# Patient Record
Sex: FEMALE | Race: WHITE | Employment: FULL TIME | URBAN - METROPOLITAN AREA
[De-identification: names, ages, dates, MRNs, and addresses within clinical notes are randomized per-mention and may not be internally consistent; named-entity substitution may affect disease eponyms.]

---

## 2018-02-14 ENCOUNTER — HOSPITAL ENCOUNTER (INPATIENT)
Facility: HOSPITAL | Age: 48
LOS: 4 days | Discharge: HOME | DRG: 189 | End: 2018-02-18
Attending: EMERGENCY MEDICINE | Admitting: HOSPITALIST
Payer: COMMERCIAL

## 2018-02-14 DIAGNOSIS — J20.5 RSV BRONCHITIS: Primary | ICD-10-CM

## 2018-02-14 DIAGNOSIS — R09.02 HYPOXIA: ICD-10-CM

## 2018-02-14 DIAGNOSIS — J98.01 ACUTE BRONCHOSPASM: ICD-10-CM

## 2018-02-14 LAB
ANION GAP SERPL CALC-SCNC: 10 MMOL/L (ref 0–18)
BASOPHILS # BLD: 0.1 K/UL (ref 0–0.2)
BASOPHILS NFR BLD: 1 %
BUN SERPL-MCNC: 11 MG/DL (ref 8–20)
BUN/CREAT SERPL: 10.3 (ref 10–20)
CALCIUM SERPL-MCNC: 8.7 MG/DL (ref 8.5–10.5)
CHLORIDE SERPL-SCNC: 98 MMOL/L (ref 95–110)
CO2 SERPL-SCNC: 27 MMOL/L (ref 22–32)
CREAT SERPL-MCNC: 1.07 MG/DL (ref 0.5–1.5)
EOSINOPHIL # BLD: 0.1 K/UL (ref 0–0.7)
EOSINOPHIL NFR BLD: 1 %
ERYTHROCYTE [DISTWIDTH] IN BLOOD BY AUTOMATED COUNT: 14 % (ref 11–15)
FLUAV + FLUBV RNA SPEC NAA+PROBE: NEGATIVE
FLUAV + FLUBV RNA SPEC NAA+PROBE: NEGATIVE
FLUAV + FLUBV RNA SPEC NAA+PROBE: POSITIVE
GLUCOSE SERPL-MCNC: 117 MG/DL (ref 70–99)
HCT VFR BLD AUTO: 43.9 % (ref 35–48)
HGB BLD-MCNC: 15.1 G/DL (ref 12–16)
LYMPHOCYTES # BLD: 2.3 K/UL (ref 1–4)
LYMPHOCYTES NFR BLD: 26 %
MCH RBC QN AUTO: 29.8 PG (ref 27–32)
MCHC RBC AUTO-ENTMCNC: 34.3 G/DL (ref 32–37)
MCV RBC AUTO: 86.9 FL (ref 80–100)
MONOCYTES # BLD: 1.1 K/UL (ref 0–1)
MONOCYTES NFR BLD: 12 %
NEUTROPHILS # BLD AUTO: 5.3 K/UL (ref 1.8–7.7)
NEUTROPHILS NFR BLD: 60 %
OSMOLALITY UR CALC.SUM OF ELEC: 280 MOSM/KG (ref 275–295)
PLATELET # BLD AUTO: 165 K/UL (ref 140–400)
PMV BLD AUTO: 8.8 FL (ref 7.4–10.3)
POTASSIUM SERPL-SCNC: 3.3 MMOL/L (ref 3.3–5.1)
RBC # BLD AUTO: 5.05 M/UL (ref 3.7–5.4)
SODIUM SERPL-SCNC: 135 MMOL/L (ref 136–144)
WBC # BLD AUTO: 8.8 K/UL (ref 4–11)

## 2018-02-14 PROCEDURE — 99253 IP/OBS CNSLTJ NEW/EST LOW 45: CPT | Performed by: INTERNAL MEDICINE

## 2018-02-14 PROCEDURE — 99223 1ST HOSP IP/OBS HIGH 75: CPT | Performed by: HOSPITALIST

## 2018-02-14 RX ORDER — IPRATROPIUM BROMIDE AND ALBUTEROL SULFATE 2.5; .5 MG/3ML; MG/3ML
3 SOLUTION RESPIRATORY (INHALATION) EVERY 4 HOURS PRN
Status: DISCONTINUED | OUTPATIENT
Start: 2018-02-14 | End: 2018-02-18

## 2018-02-14 RX ORDER — ALBUTEROL SULFATE 2.5 MG/3ML
2.5 SOLUTION RESPIRATORY (INHALATION) ONCE
Status: COMPLETED | OUTPATIENT
Start: 2018-02-14 | End: 2018-02-14

## 2018-02-14 RX ORDER — ONDANSETRON 2 MG/ML
4 INJECTION INTRAMUSCULAR; INTRAVENOUS EVERY 6 HOURS PRN
Status: DISCONTINUED | OUTPATIENT
Start: 2018-02-14 | End: 2018-02-18

## 2018-02-14 RX ORDER — SODIUM CHLORIDE 9 MG/ML
INJECTION, SOLUTION INTRAVENOUS CONTINUOUS
Status: DISCONTINUED | OUTPATIENT
Start: 2018-02-14 | End: 2018-02-15

## 2018-02-14 RX ORDER — METHYLPREDNISOLONE SODIUM SUCCINATE 40 MG/ML
40 INJECTION, POWDER, LYOPHILIZED, FOR SOLUTION INTRAMUSCULAR; INTRAVENOUS EVERY 6 HOURS
Status: DISCONTINUED | OUTPATIENT
Start: 2018-02-14 | End: 2018-02-15

## 2018-02-14 RX ORDER — IPRATROPIUM BROMIDE AND ALBUTEROL SULFATE 2.5; .5 MG/3ML; MG/3ML
3 SOLUTION RESPIRATORY (INHALATION) ONCE
Status: COMPLETED | OUTPATIENT
Start: 2018-02-14 | End: 2018-02-14

## 2018-02-14 RX ORDER — SODIUM CHLORIDE 0.9 % (FLUSH) 0.9 %
3 SYRINGE (ML) INJECTION AS NEEDED
Status: DISCONTINUED | OUTPATIENT
Start: 2018-02-14 | End: 2018-02-18

## 2018-02-14 RX ORDER — ASCORBIC ACID 500 MG
1000 TABLET ORAL DAILY
COMMUNITY

## 2018-02-14 RX ORDER — METHYLPREDNISOLONE SODIUM SUCCINATE 125 MG/2ML
125 INJECTION, POWDER, LYOPHILIZED, FOR SOLUTION INTRAMUSCULAR; INTRAVENOUS ONCE
Status: COMPLETED | OUTPATIENT
Start: 2018-02-14 | End: 2018-02-14

## 2018-02-14 RX ORDER — ALBUTEROL SULFATE 90 UG/1
2 AEROSOL, METERED RESPIRATORY (INHALATION) EVERY 4 HOURS PRN
Qty: 1 INHALER | Refills: 0 | Status: SHIPPED | OUTPATIENT
Start: 2018-02-14 | End: 2018-02-18

## 2018-02-14 RX ORDER — GUAIFENESIN 100 MG/5ML
200 SOLUTION ORAL EVERY 4 HOURS PRN
Status: DISCONTINUED | OUTPATIENT
Start: 2018-02-14 | End: 2018-02-15

## 2018-02-14 RX ORDER — PREDNISONE 20 MG/1
40 TABLET ORAL DAILY
Qty: 8 TABLET | Refills: 0 | Status: SHIPPED | OUTPATIENT
Start: 2018-02-14 | End: 2018-02-18

## 2018-02-14 RX ORDER — ENOXAPARIN SODIUM 100 MG/ML
40 INJECTION SUBCUTANEOUS EVERY 24 HOURS
Status: DISCONTINUED | OUTPATIENT
Start: 2018-02-14 | End: 2018-02-18

## 2018-02-14 RX ORDER — ALBUTEROL SULFATE 2.5 MG/3ML
2.5 SOLUTION RESPIRATORY (INHALATION) ONCE
Status: DISCONTINUED | OUTPATIENT
Start: 2018-02-14 | End: 2018-02-14

## 2018-02-14 RX ORDER — ACETAMINOPHEN 325 MG/1
650 TABLET ORAL EVERY 6 HOURS PRN
Status: DISCONTINUED | OUTPATIENT
Start: 2018-02-14 | End: 2018-02-18

## 2018-02-14 NOTE — ED NOTES
Care assumed from triage. Patient presents from [address] [place] [address] for shortness of breath and decrease O2 saturations. Patient reports flu-like symptoms since Friday including cough, fever, and body aches. Denies chest pain.  Patient reports increased shortness of

## 2018-02-14 NOTE — ED INITIAL ASSESSMENT (HPI)
Sent from immediate care today for low oxygen level and sob. Dx with flu like symptoms since Friday. C/o cough with small amt of mucous production, fever, and generalized body pain.   Had CXR done at [address] [place] [address] prior to arrival.

## 2018-02-14 NOTE — ED NOTES
Patient is resting comfortably. Reporting decrease in SOB. Unsuccessful room air trial, saturations drop to 88% on room air, placed back on nasal canula. O2 saturations continue at 89-92%. MD aware. Pt denies additional needs at this time.  Will continue to

## 2018-02-14 NOTE — CONSULTS
[facility]    Report of Consultation    Albertina Maria Tlisa Patient Status:  Emergency    3/9/1970 MRN [id number]   Location [address] Attending Cameron Mae MD   Hosp Day # 0 PCP Verner Stabler, MD     Date o rash, itching  Neurological: denies syncope, weakness, dizziness,   Psychiatric: denies depression, anxiety  Hematologic: denies bruising        Physical Exam:   Blood pressure 128/69, pulse 87, temperature 98.7 °F (37.1 °C), temperature source Oral, resp.

## 2018-02-14 NOTE — ED PROVIDER NOTES
Patient Seen in: [facility] AND [facility] Emergency Department    History   Patient presents with:  Dyspnea CHARISMA SOB (respiratory)    Stated Complaint: sob    HPI    Patient presents with onset of Friday with fevers cough congestion body aches and tiredness.   Sh (!) 85%   BMI 30.18 kg/m²         Physical Exam  Constitutional:  Alert, well nourished adult lying in bed in no distress. Vital signs noted. Eye:  No scleral icterus. Eyelids appear normal, no lesions.   HEENT: Oropharynx moist there is no erythema no e within normal limits   INFLUENZA A/B AND RSV PCR - Abnormal; Notable for the following:     RSV by PCR Positive (*)     All other components within normal limits   CBC W/ DIFFERENTIAL - Abnormal; Notable for the following:     Monocyte Absolute 1.1 (*)

## 2018-02-14 NOTE — ED NOTES
Patient accompanied by transport and family to inpatient unit. Patient agreeable with plan of care. Hemodynamically stable and interacting appropriately.

## 2018-02-14 NOTE — H&P
[facility]    PATIENT'S NAME: Serafin Rebolledo   ATTENDING PHYSICIAN: Zion Urena.  Lynn Alegria MD   PATIENT ACCOUNT#:   [id number]    LOCATION:  [place] [address]  MEDICAL RECORD #:   [id number]       YOB: 1970  ADMISSION DATE:       02/14/2018 palate. Eyes: Injected conjunctivae bilaterally. Pupils equal, round, reactive to light and accommodation. Extraocular muscle movements intact. NECK:  Supple. No lymphadenopathy. Trachea midline. Full range of motion.   LUNGS:  Clear to auscultatio

## 2018-02-15 ENCOUNTER — APPOINTMENT (OUTPATIENT)
Dept: GENERAL RADIOLOGY | Facility: HOSPITAL | Age: 48
DRG: 189 | End: 2018-02-15
Attending: HOSPITALIST
Payer: COMMERCIAL

## 2018-02-15 LAB
ANION GAP SERPL CALC-SCNC: 7 MMOL/L (ref 0–18)
BASOPHILS # BLD: 0 K/UL (ref 0–0.2)
BASOPHILS NFR BLD: 0 %
BUN SERPL-MCNC: 13 MG/DL (ref 8–20)
BUN/CREAT SERPL: 18.8 (ref 10–20)
CALCIUM SERPL-MCNC: 8.9 MG/DL (ref 8.5–10.5)
CHLORIDE SERPL-SCNC: 104 MMOL/L (ref 95–110)
CO2 SERPL-SCNC: 27 MMOL/L (ref 22–32)
CREAT SERPL-MCNC: 0.69 MG/DL (ref 0.5–1.5)
EOSINOPHIL # BLD: 0 K/UL (ref 0–0.7)
EOSINOPHIL NFR BLD: 0 %
ERYTHROCYTE [DISTWIDTH] IN BLOOD BY AUTOMATED COUNT: 13.8 % (ref 11–15)
GLUCOSE SERPL-MCNC: 157 MG/DL (ref 70–99)
HCT VFR BLD AUTO: 41.1 % (ref 35–48)
HGB BLD-MCNC: 14.1 G/DL (ref 12–16)
LYMPHOCYTES # BLD: 1.5 K/UL (ref 1–4)
LYMPHOCYTES NFR BLD: 19 %
MCH RBC QN AUTO: 29.9 PG (ref 27–32)
MCHC RBC AUTO-ENTMCNC: 34.2 G/DL (ref 32–37)
MCV RBC AUTO: 87.5 FL (ref 80–100)
MONOCYTES # BLD: 0.4 K/UL (ref 0–1)
MONOCYTES NFR BLD: 5 %
NEUTROPHILS # BLD AUTO: 6.2 K/UL (ref 1.8–7.7)
NEUTROPHILS NFR BLD: 76 %
OSMOLALITY UR CALC.SUM OF ELEC: 289 MOSM/KG (ref 275–295)
PLATELET # BLD AUTO: 191 K/UL (ref 140–400)
PMV BLD AUTO: 9.5 FL (ref 7.4–10.3)
POTASSIUM SERPL-SCNC: 3.9 MMOL/L (ref 3.3–5.1)
RBC # BLD AUTO: 4.7 M/UL (ref 3.7–5.4)
SODIUM SERPL-SCNC: 138 MMOL/L (ref 136–144)
WBC # BLD AUTO: 8.1 K/UL (ref 4–11)

## 2018-02-15 PROCEDURE — 99233 SBSQ HOSP IP/OBS HIGH 50: CPT | Performed by: INTERNAL MEDICINE

## 2018-02-15 PROCEDURE — 99233 SBSQ HOSP IP/OBS HIGH 50: CPT | Performed by: HOSPITALIST

## 2018-02-15 PROCEDURE — 71046 X-RAY EXAM CHEST 2 VIEWS: CPT | Performed by: HOSPITALIST

## 2018-02-15 RX ORDER — AZITHROMYCIN 250 MG/1
500 TABLET, FILM COATED ORAL
Status: DISCONTINUED | OUTPATIENT
Start: 2018-02-15 | End: 2018-02-18

## 2018-02-15 RX ORDER — GUAIFENESIN 600 MG
600 TABLET, EXTENDED RELEASE 12 HR ORAL 2 TIMES DAILY
Status: DISCONTINUED | OUTPATIENT
Start: 2018-02-15 | End: 2018-02-18

## 2018-02-15 RX ORDER — METHYLPREDNISOLONE SODIUM SUCCINATE 40 MG/ML
40 INJECTION, POWDER, LYOPHILIZED, FOR SOLUTION INTRAMUSCULAR; INTRAVENOUS EVERY 8 HOURS
Status: DISCONTINUED | OUTPATIENT
Start: 2018-02-15 | End: 2018-02-17

## 2018-02-15 RX ORDER — BENZONATATE 100 MG/1
100 CAPSULE ORAL 3 TIMES DAILY PRN
Status: DISCONTINUED | OUTPATIENT
Start: 2018-02-15 | End: 2018-02-18

## 2018-02-15 RX ORDER — ALBUTEROL SULFATE 2.5 MG/3ML
2.5 SOLUTION RESPIRATORY (INHALATION)
Status: DISCONTINUED | OUTPATIENT
Start: 2018-02-15 | End: 2018-02-16

## 2018-02-15 NOTE — PLAN OF CARE
Problem: Patient/Family Goals  Goal: Patient/Family Long Term Goal  Patient's Long Term Goal: discharge home    Interventions:  - Pt receiving IVF, IV solu medrol, nebs oxygen.    - See additional Care Plan goals for specific interventions    Outcome: Progr

## 2018-02-15 NOTE — PAYOR COMM NOTE
--------------  ADMISSION REVIEW     Payor: ALONA SALAS  Subscriber #:  [id number]  Authorization Number: [id number]    Admit date: 2/14/18  Admit time: [id number]       Patient Seen in: [facility] Emergency Department    History   Patient presents with: noted  Cardiovascular:  Normal S1 and S2, no murmur, regular, with good peripheral perfusion.   No extremity edema noted  Respiratory:    Diminished breath sounds at the bases no audible wheezes noted initially slightly increased respiratory rate  Abdomen: EXAMINATION    REASON FOR ADMISSION:  Acute respiratory failure, hypoxemia, RSV bronchitis.     HISTORY OF PRESENT ILLNESS:  Patient is a 44-year-old  female who started having generalized body aches, fevers, and progressive wheezing for the last 2 edema, clubbing, or cyanosis. NEUROLOGIC:  Motor and sensory intact. Cranial nerves II-XII are intact. ASSESSMENT:    1. Acute respiratory failure with hypoxemia. 2.   Acute bronchitis with positive respiratory syncytial virus infection.     REBECCA

## 2018-02-15 NOTE — PROGRESS NOTES
Pulmonary/Critical Care Follow Up Note    HPI:   Saad Landry is a 49year old female with Patient presents with:  Dyspnea CHARISMA SOB (respiratory)      PCP Tobin Liao MD  Admission Attending Jazmyn Becker MD    Hospital Day #1    No fever  Cough  ?  Whe CREATSERUM 0.69 02/15/2018   BUN 13 02/15/2018    02/15/2018   K 3.9 02/15/2018    02/15/2018   CO2 27 02/15/2018    02/15/2018   CA 8.9 02/15/2018           ASSESSMENT/PLAN:     1.   Acute hypoxemia  2/2 RSV  H/o smoking  Still in high

## 2018-02-15 NOTE — CM/SW NOTE
SW received [facility]. SW met w/ pt to discuss eventual discharge needs. Pt's  was also present. Pt lives at home w/ her supportive  and 2 children. Pt lives in a 2 level house w/ [address] stars.  Pt has a mother in law, aunt & un

## 2018-02-15 NOTE — PROGRESS NOTES
[facility]  Progress Note     Lien Guillen  : 3/9/1970    Status: Inpatient  Day #: 1    Attending: Ardell Olszewski, MD  PCP: Trice Vaughan MD      Assessment and Plan     Acute hypoxemic respiratory failure  - secondary to RSV infection 02/14/18 1743 216 lb (98 kg)         Body mass index is 34.86 kg/m².     Recent Labs   Lab  02/14/18   1208  02/15/18   0617   WBC  8.8  8.1   HGB  15.1  14.1   HCT  43.9  41.1   PLT  165  191   RBC  5.05  4.70   MCV  86.9  87.5   MCH  29.8  29.9   MCHC

## 2018-02-16 ENCOUNTER — APPOINTMENT (OUTPATIENT)
Dept: CT IMAGING | Facility: HOSPITAL | Age: 48
DRG: 189 | End: 2018-02-16
Attending: HOSPITALIST
Payer: COMMERCIAL

## 2018-02-16 ENCOUNTER — APPOINTMENT (OUTPATIENT)
Dept: GENERAL RADIOLOGY | Facility: HOSPITAL | Age: 48
DRG: 189 | End: 2018-02-16
Attending: INTERNAL MEDICINE
Payer: COMMERCIAL

## 2018-02-16 LAB
ANION GAP SERPL CALC-SCNC: 11 MMOL/L (ref 0–18)
BASE EXCESS BLD CALC-SCNC: 1.7 MMOL/L (ref ?–2)
BNP SERPL-MCNC: 115 PG/ML (ref 0–100)
BUN SERPL-MCNC: 16 MG/DL (ref 8–20)
BUN/CREAT SERPL: 17.4 (ref 10–20)
CALCIUM SERPL-MCNC: 9.1 MG/DL (ref 8.5–10.5)
CHLORIDE SERPL-SCNC: 106 MMOL/L (ref 95–110)
CO2 SERPL-SCNC: 25 MMOL/L (ref 22–32)
CREAT SERPL-MCNC: 0.92 MG/DL (ref 0.5–1.5)
GLUCOSE SERPL-MCNC: 144 MG/DL (ref 70–99)
HBA1C MFR BLD: 5.9 % (ref 4–6)
HCO3 BLDA-SCNC: 25.4 MEQ/L (ref 21–27)
MODIFIED ALLEN TEST: POSITIVE
O2 CT BLD-SCNC: 16.5 VOL% (ref 15–23)
OSMOLALITY UR CALC.SUM OF ELEC: 298 MOSM/KG (ref 275–295)
OXYGEN LITERS/MINUTE: 5 L/MIN
PCO2 BLDA: 39 MM HG (ref 35–45)
PH BLDA: 7.43 [PH] (ref 7.35–7.45)
PO2 BLDA: 50 MM HG (ref 80–100)
PO2 SATN ADJ TO 0.5 BLD: 20 MMHG (ref 24–28)
POTASSIUM SERPL-SCNC: 4 MMOL/L (ref 3.3–5.1)
PUNCTURE CHARGE: YES
SAO2 % BLDA: 92 % (ref 94–100)
SODIUM SERPL-SCNC: 142 MMOL/L (ref 136–144)

## 2018-02-16 PROCEDURE — 99233 SBSQ HOSP IP/OBS HIGH 50: CPT | Performed by: HOSPITALIST

## 2018-02-16 PROCEDURE — 99232 SBSQ HOSP IP/OBS MODERATE 35: CPT | Performed by: INTERNAL MEDICINE

## 2018-02-16 PROCEDURE — 71045 X-RAY EXAM CHEST 1 VIEW: CPT | Performed by: INTERNAL MEDICINE

## 2018-02-16 PROCEDURE — 71260 CT THORAX DX C+: CPT | Performed by: HOSPITALIST

## 2018-02-16 RX ORDER — IPRATROPIUM BROMIDE AND ALBUTEROL SULFATE 2.5; .5 MG/3ML; MG/3ML
3 SOLUTION RESPIRATORY (INHALATION)
Status: DISCONTINUED | OUTPATIENT
Start: 2018-02-16 | End: 2018-02-18

## 2018-02-16 NOTE — PROGRESS NOTES
[facility]  Progress Note     Tony Trinh  : 3/9/1970    Status: Inpatient  Day #: 2    Attending: Keiry Butterfield MD  PCP: Jordon Duval MD      Assessment and Plan     Acute hypoxemic respiratory failure  - secondary to RSV infection or guarding. Neurologic: No focal neurological deficits. Musculoskeletal: Moves all extremities. Extremities: No edema.       Intake/Output Summary (Last 24 hours) at 02/16/18 0840  Last data filed at 02/15/18 1745   Gross per 24 hour   Intake

## 2018-02-16 NOTE — PROGRESS NOTES
Pulmonary/Critical Care Follow Up Note    HPI:   Garcia Ortiz is a 49year old female with Patient presents with:  Dyspnea CHARISMA SOB (respiratory)      PCP Isiah Diggs MD  Admission Attending Marialuisa Low MD    Hospital Day #2    Feeling better  Min co CREATSERUM 0.92 02/16/2018   BUN 16 02/16/2018    02/16/2018   K 4.0 02/16/2018    02/16/2018   CO2 25 02/16/2018    02/16/2018   CA 9.1 02/16/2018           ASSESSMENT/PLAN:     1.   Acute hypoxemia  2/2 RSV  H/o smoking  O2 now 94-95

## 2018-02-17 PROCEDURE — 99232 SBSQ HOSP IP/OBS MODERATE 35: CPT | Performed by: INTERNAL MEDICINE

## 2018-02-17 PROCEDURE — 99233 SBSQ HOSP IP/OBS HIGH 50: CPT | Performed by: HOSPITALIST

## 2018-02-17 RX ORDER — METHYLPREDNISOLONE SODIUM SUCCINATE 40 MG/ML
40 INJECTION, POWDER, LYOPHILIZED, FOR SOLUTION INTRAMUSCULAR; INTRAVENOUS EVERY 12 HOURS
Status: DISCONTINUED | OUTPATIENT
Start: 2018-02-17 | End: 2018-02-18

## 2018-02-17 NOTE — PLAN OF CARE
Patient did three laps around the unit with 3 L of O2 sat 91 %, heart rate 111. Pt back in bed resting comfortably.

## 2018-02-17 NOTE — PROGRESS NOTES
[facility]  Progress Note     Jairon Angel  : 3/9/1970    Status: Inpatient  Day #: 3    Attending: Zohra Rojas MD  PCP: Sultana Shell MD      Assessment and Plan     Acute hypoxemic respiratory failure  - secondary to RSV infection 1110 187 lb (84.8 kg)   02/14/18 1743 216 lb (98 kg)         Body mass index is 34.86 kg/m².     Recent Labs   Lab  02/15/18   0617   WBC  8.1   HGB  14.1   HCT  41.1   PLT  191   RBC  4.70   MCV  87.5   MCH  29.9   MCHC  34.2   RDW  13.8     Recent Labs

## 2018-02-17 NOTE — PROGRESS NOTES
[facility]     Progress Note        Jairon Reno Patient Status:  Inpatient    3/9/1970 MRN [id number]   Location [facility] 5SW/SE Attending Leopoldo Bruns, MD   Hosp Day # 3 PCP Sultana Shell MD       Subjective:   Patient seen soft, non tender  Extremities: no clubbing, cyanosis, edema  Neurologic: no gross motor deficits  Skin: warm, dry      Results:        Xr Chest Ap Portable  (cpt=71045)    Result Date: 2/16/2018  CONCLUSION: Normal examination.           Ct Chest Pain/pe (i

## 2018-02-18 VITALS
OXYGEN SATURATION: 90 % | DIASTOLIC BLOOD PRESSURE: 75 MMHG | HEIGHT: 66 IN | HEART RATE: 94 BPM | TEMPERATURE: 99 F | SYSTOLIC BLOOD PRESSURE: 147 MMHG | BODY MASS INDEX: 34.72 KG/M2 | RESPIRATION RATE: 18 BRPM | WEIGHT: 216 LBS

## 2018-02-18 PROCEDURE — 3E0234Z INTRODUCTION OF SERUM, TOXOID AND VACCINE INTO MUSCLE, PERCUTANEOUS APPROACH: ICD-10-PCS | Performed by: EMERGENCY MEDICINE

## 2018-02-18 PROCEDURE — 99232 SBSQ HOSP IP/OBS MODERATE 35: CPT | Performed by: INTERNAL MEDICINE

## 2018-02-18 PROCEDURE — 99239 HOSP IP/OBS DSCHRG MGMT >30: CPT | Performed by: HOSPITALIST

## 2018-02-18 RX ORDER — BENZONATATE 100 MG/1
100 CAPSULE ORAL 3 TIMES DAILY PRN
Qty: 20 CAPSULE | Refills: 0 | Status: SHIPPED | OUTPATIENT
Start: 2018-02-18

## 2018-02-18 RX ORDER — PREDNISONE 20 MG/1
40 TABLET ORAL DAILY
Qty: 8 TABLET | Refills: 0 | Status: SHIPPED | OUTPATIENT
Start: 2018-02-18 | End: 2018-02-22

## 2018-02-18 RX ORDER — ALBUTEROL SULFATE 90 UG/1
2 AEROSOL, METERED RESPIRATORY (INHALATION) EVERY 4 HOURS PRN
Qty: 1 INHALER | Refills: 0 | Status: SHIPPED | OUTPATIENT
Start: 2018-02-18 | End: 2018-03-20

## 2018-02-18 RX ORDER — AZITHROMYCIN 250 MG/1
250 TABLET, FILM COATED ORAL DAILY
Qty: 3 TABLET | Refills: 0 | Status: SHIPPED | OUTPATIENT
Start: 2018-02-18 | End: 2018-02-21

## 2018-02-18 NOTE — PROGRESS NOTES
Patients oxygen saturation at rest on room air is 86% and her heart rate is 94/min.  Patients oxygen saturation on 2 liters is 90% and her heart rate is 94/minute at rest..  The patients heart rate ambulating on room air was 116/minute and her oxygen satura

## 2018-02-18 NOTE — DISCHARGE SUMMARY
[facility]    Discharge Summary    Sharonda Velasquez Patient Status:  Inpatient    3/9/1970 MRN [id number]   Location [facility] 5SW/SE Attending Estuardo Keys MD   Hosp Day # 4 PCP Consuelo Griffin MD     Date of Admission: 2018 ellipta/cont abx/supplemental oxygen  - CT chest reviewed.    - IS     RSV respiratory infection  - supportive care  - nebs/steroids  - antitussive.      Hyperglycemia  - likely secondary to steroids  - Check hba1c level 5.9  - monitor for now.      Nicotin secretions/mucous plugging present in the lower lobes. 4. Prevascular lymph nodes are likely reactive. These can be reassessed at followup.               Other Discharge Instructions:       FU with PCP in 1 week  FU with Dr. Do Owen in 2 weeks  Oxygen as pre

## 2018-02-18 NOTE — CM/SW NOTE
SW was notified that pt will need home O2. SW sent paperwork over to [facility]. 1110: SW received phone call from [facility] stating that they need room air sats for pt. DOTTIE notified RN. 1230: E able to provide O2 for pt.  DOTTIE notified RN that per [facility], it will luly

## 2018-02-18 NOTE — PROGRESS NOTES
[facility]     Progress Note        Les Peter Patient Status:  Inpatient    3/9/1970 MRN [id number]   Location [facility] 5SW/SE Attending Dmitry Lozano MD   Hosp Day # 4 PCP Rosalina Hinojosa MD       Subjective:   Patient seen Assessment   1. Acute hypoxemic respiratory failure  2. RSV respiratory infection  3. Prior nicotine dependence  4.   Acute bronchitis     Plan   -She with evidence of acute hypoxemic respiratory failure with clinical presentation likely consis

## 2018-02-22 ENCOUNTER — TELEPHONE (OUTPATIENT)
Dept: PULMONOLOGY | Facility: CLINIC | Age: 48
End: 2018-02-22

## 2018-02-22 NOTE — TELEPHONE ENCOUNTER
Pt requesting appt for hospital follow up with [facility] on 3/2/18 or 3/5/18. Pt states per [facility] she should be seen within two weeks.  Please call thank you [phone]

## 2018-02-28 NOTE — TELEPHONE ENCOUNTER
Pt stts she saw Dr. Lynda Rodriguez (Dr. Roger Zazueta partner) & he told her that she doesn't need to f/u w/ Pulmonologist (Dr. Aden Devlin). Explained she may disregard letter mailed today since unable to reach her & will inform Dr. Aden Devlin.  She verbalized understand

## (undated) NOTE — LETTER
2/28/2018              [address]         Dear Cinda Better,    This letter is to inform you that our office has made several attempts to reach you by phone without success.   We were attempting to contact you by elizabeth